# Patient Record
Sex: FEMALE | Race: BLACK OR AFRICAN AMERICAN | NOT HISPANIC OR LATINO | Employment: UNEMPLOYED | URBAN - METROPOLITAN AREA
[De-identification: names, ages, dates, MRNs, and addresses within clinical notes are randomized per-mention and may not be internally consistent; named-entity substitution may affect disease eponyms.]

---

## 2019-12-19 PROBLEM — E03.8 OTHER SPECIFIED HYPOTHYROIDISM: Status: ACTIVE | Noted: 2019-12-19

## 2019-12-19 PROBLEM — I10 ESSENTIAL HYPERTENSION: Status: ACTIVE | Noted: 2019-12-19

## 2019-12-19 PROBLEM — M81.0 OSTEOPOROSIS: Status: ACTIVE | Noted: 2019-12-19

## 2019-12-19 PROBLEM — E78.5 HYPERLIPIDEMIA: Status: ACTIVE | Noted: 2019-12-19

## 2020-08-11 PROBLEM — G89.29 CHRONIC WRIST PAIN, LEFT: Status: ACTIVE | Noted: 2020-08-11

## 2020-08-11 PROBLEM — R25.2 LEG CRAMPS: Status: ACTIVE | Noted: 2018-06-19

## 2020-08-11 PROBLEM — M25.532 CHRONIC WRIST PAIN, LEFT: Status: ACTIVE | Noted: 2020-08-11

## 2020-08-11 PROBLEM — B35.1 ONYCHOMYCOSIS OF TOENAIL: Status: ACTIVE | Noted: 2019-07-09

## 2020-08-11 PROBLEM — M18.12 ARTHRITIS OF CARPOMETACARPAL (CMC) JOINT OF LEFT THUMB: Status: ACTIVE | Noted: 2020-08-11

## 2020-08-11 PROBLEM — M25.561 ACUTE PAIN OF RIGHT KNEE: Status: ACTIVE | Noted: 2018-10-09

## 2020-10-20 PROBLEM — G89.29 CHRONIC PAIN OF BOTH KNEES: Status: ACTIVE | Noted: 2020-10-20

## 2020-10-20 PROBLEM — M25.562 CHRONIC PAIN OF BOTH KNEES: Status: ACTIVE | Noted: 2020-10-20

## 2020-10-20 PROBLEM — M17.11 OSTEOARTHRITIS OF RIGHT KNEE: Status: ACTIVE | Noted: 2020-10-20

## 2020-10-20 PROBLEM — M25.561 CHRONIC PAIN OF BOTH KNEES: Status: ACTIVE | Noted: 2020-10-20

## 2021-03-22 PROBLEM — J45.909 ASTHMA: Status: ACTIVE | Noted: 2021-03-22

## 2021-11-02 PROBLEM — E66.01 SEVERE OBESITY (BMI >= 40): Status: ACTIVE | Noted: 2021-11-02

## 2021-11-02 PROBLEM — G89.29 CHRONIC PAIN OF RIGHT KNEE: Status: ACTIVE | Noted: 2018-10-09

## 2021-11-05 PROBLEM — E66.01 OBESITY, CLASS III, BMI 40-49.9 (MORBID OBESITY): Chronic | Status: ACTIVE | Noted: 2021-11-02

## 2022-06-02 PROBLEM — M46.1 SACROILIITIS: Status: ACTIVE | Noted: 2022-06-02

## 2023-02-07 ENCOUNTER — PATIENT OUTREACH (OUTPATIENT)
Dept: ADMINISTRATIVE | Facility: OTHER | Age: 63
End: 2023-02-07

## 2023-02-07 NOTE — PROGRESS NOTES
CHW - Initial Contact    This Community Health Worker completed the Social Determinant of Health questionnaire with patient during clinic visit today.    Pt identified barriers of most importance are: patient identified no barriers of importance during clinic visit    Referrals to community agencies completed with patient consent outside of [facility] include: No referral needed during clinic visit  Referrals were put through [facility] - no:   Support and Services: No support services needed during clinic visit  Other information discussed the patient needs  help with: patient discussed no information during clinic visit    Follow up required: No   No future outreach task assigned

## 2023-06-26 ENCOUNTER — PATIENT OUTREACH (OUTPATIENT)
Dept: ADMINISTRATIVE | Facility: OTHER | Age: 63
End: 2023-06-26

## 2023-06-26 NOTE — PROGRESS NOTES
CHW - Follow Up    This Community Health Worker completed a follow up visit with patient via telephone today.  Pt/ reported: patient reported she is okay no assistance is needed during phone interview.   Will reach if assistance is needed in the future.   Community Health Worker provided: CHW spoke patient she is okay.   Follow up required: No  No future outreach task assigned       CHW - Case Closure    This Community Health Worker spoke to patient via telephone today.   Pt reported:  patient reported she is okay no assistance is needed during phone interview.   Will reach if assistance is needed in the future.   Pt denied any additional needs at this time and agrees with episode closure at this time.  Provided patient with Community Health Worker's contact information and encouraged her to contact this Community Health Worker if additional needs arise.

## 2024-06-07 ENCOUNTER — PATIENT OUTREACH (OUTPATIENT)
Dept: ADMINISTRATIVE | Facility: OTHER | Age: 64
End: 2024-06-07

## 2024-06-07 NOTE — PROGRESS NOTES
CHW - Initial Contact    This Community Health Worker completed the Social Determinant of Health questionnaire with patient during clinic visit today.    Pt identified barriers of most importance are: Patient identified no barriers of importance during clinic visit    Referrals to community agencies completed with patient consent outside of [facility] include: No community referral needed during clinic visit  Referrals were put through [facility] - no:   Support and Services: No support services needed during clinic visit  Other information discussed the patient needs help with: patient discussed no other information during clinic visit    Follow up required: Yes  Follow-up Outreach - Due: 6/21/2024

## 2024-08-14 ENCOUNTER — PATIENT OUTREACH (OUTPATIENT)
Dept: ADMINISTRATIVE | Facility: OTHER | Age: 64
End: 2024-08-14

## 2024-08-14 NOTE — PROGRESS NOTES
CHW - Outreach Attempt    Community Health Worker left a voicemail message for 1st attempt to contact patient regarding: follow up phone interview   Community Health Worker to attempt to contact patient on: 08/14/2024

## 2024-09-09 ENCOUNTER — PATIENT OUTREACH (OUTPATIENT)
Dept: ADMINISTRATIVE | Facility: OTHER | Age: 64
End: 2024-09-09

## 2024-09-09 NOTE — PROGRESS NOTES
CHW - Follow Up    This Community Health Worker completed a follow up visit with patient during clinic visit today.  Pt reported: patient reported she is doing fine no assistance is needed during follow up clinic visit   Patient will reach out if assistance is needed in the future.   Community Health Worker provided: CHW spoke with patient she is doing fine   Follow up required: no  No future outreach task assigned                   CHW - Case Closure    This Community Health Worker spoke to patient during clinic visit today.   Pt reported: patient reported she is doing fine no assistance is needed during follow up clinic visit   Patient will reach out if assistance is needed in the future.  Pt denied any additional needs at this time and agrees with episode closure at this time.    Provided patient with Community Health Worker's contact information and encouraged   her to contact this Community Health Worker if additional needs arise.

## 2025-03-10 ENCOUNTER — PATIENT OUTREACH (OUTPATIENT)
Dept: ADMINISTRATIVE | Facility: OTHER | Age: 65
End: 2025-03-10

## 2025-03-10 NOTE — PROGRESS NOTES
CHW - Initial Contact    This Community Health Worker completed the Social Determinant of Health questionnaire with patient during clinic visit today.    Pt identified barriers of most importance are: patient identified no barriers of importance during clinic visit    Referrals to community agencies completed with patient consent outside of [facility] include: No community referral needed  During clinic visit   Referrals were put through [facility] - no:   Support and Services: No support services needed during clinic visit   Other information discussed the patient needs help with: patient discussed no other information during clinic visit    Follow up required: yes  Follow-up Outreach - Due: 3/24/2025

## 2025-04-01 ENCOUNTER — PATIENT OUTREACH (OUTPATIENT)
Facility: OTHER | Age: 65
End: 2025-04-01

## 2025-04-02 NOTE — PROGRESS NOTES
Patient was seen in the ED on 3/31/25. Phoned patient on 2 separate occasions to assist with Post ED Discharge Navigation. Patient was unavailable. Message left on voice mail. Encounter closed.  Ryan Velazquez

## 2025-04-29 PROBLEM — S99.922A FOOT INJURY, LEFT, INITIAL ENCOUNTER: Status: ACTIVE | Noted: 2025-04-29

## 2025-04-29 PROBLEM — M79.672 LEFT FOOT PAIN: Status: ACTIVE | Noted: 2025-04-29

## 2025-04-29 PROBLEM — S92.355A NONDISPLACED FRACTURE OF FIFTH METATARSAL BONE, LEFT FOOT, INITIAL ENCOUNTER FOR CLOSED FRACTURE: Status: ACTIVE | Noted: 2025-04-29

## 2025-05-29 ENCOUNTER — PATIENT OUTREACH (OUTPATIENT)
Dept: ADMINISTRATIVE | Facility: OTHER | Age: 65
End: 2025-05-29

## 2025-05-29 NOTE — PROGRESS NOTES
CHW - Follow Up    This Community Health Worker completed a follow up visit with patient via telephone today.  Pt/Caregiver reported: patient she is doing alright no assistance is needed during follow up phone interview.   Patient will reach out if assistance is needed in the future. Patient also reminded of upcoming appointment with provider.  Community Health Worker provided: patient reported she is doing alright.  Follow up required: No  Case Closure - Due: 5/29/2025                 CHW - Case Closure    This Community Health Worker spoke to patient via telephone today.   Pt/Caregiver reported: patient she is doing alright no assistance is needed during follow up phone interview.   Patient will reach out if assistance is needed in the future. Patient also reminded of upcoming appointment with provider.  Pt/Caregiver denied any additional needs at this time and agrees with episode closure at this time.    Provided patient with Community Health Worker's contact information and encouraged   her to contact this Community Health Worker if additional needs arise.